# Patient Record
(demographics unavailable — no encounter records)

---

## 2025-02-13 NOTE — PHYSICAL EXAM
[de-identified] : C spine decreased extension and pain on hyperextension The shoulder was prepped with alcohol and ethyl chloride was used to numb the skin. A 3 cc injection with 1 cc xylocaine, 1cc sensoricaine and 1 cc kenalog was given without complication into the AC joint. Patient instructed that there may be an inflammatory flare for 24 hrs , to use ice or advil if needed. HAnd neuro exam intact ( however she says at times it feels weak) Right shoulder AROM  ER 90 IR L3 Positive Neer and Yañez SS 4/5 and subscapularis lift off 4/5 to isometric testing ER (IS) 5/5    [de-identified] : 4 views of right shoulder are WNL no head elevation no GH DJD.

## 2025-02-13 NOTE — HISTORY OF PRESENT ILLNESS
[de-identified] : 48yr old patient presents today with right shoulder pain sustained from an injury 22 years ago when she was playing with her . She had pain on and off but it has worsend over the past 2-3 years.  SHe has arm pain and feeling of weakness plus some hand symptoms.

## 2025-02-13 NOTE — PROCEDURE
[de-identified] : The left shoulder was prepped with alcohol and ethyl chloride was used to numb the skin. A 6 cc injection with 3 cc xylocaine, 2 cc sensoricaine and 1 cc kenalog was given without complication into the SA space. Patient instructed that there may be an inflammatory flare for 24 hrs , to use ice or advil if needed

## 2025-02-13 NOTE — DISCUSSION/SUMMARY
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  On questioning she also has pericervical discomfort.  I believe she likely has a subscapularis and supraspinatus partial or complete tear with secondary impingement however she may also have some mild cervical issues contributing.   Plan:  SA injection today decrease pain some but subscapularis weakness persists. SS improved.  Need MRI to rule out RC tear  Begin PT for shoulder and C spine.  If MRI of shoulder shows no tear will further work-up the C spine.  Need C spine x-rays next visit.  This visit took 45 minutes.

## 2025-03-06 NOTE — PHYSICAL EXAM
[de-identified] : Right shoulder positive Neer and Yañez impingement sign.  SS 4/5 to isometric testing  [de-identified] : MRI shows clear impingement and multi-tendon Tendonitis but no high grade partial or full thickness RC tear.

## 2025-03-06 NOTE — DISCUSSION/SUMMARY
[de-identified] : Clear grade 2 impingement with no high grade partial or full thickness tear.  Plan:  Move forward with 2 months RC and scapular stabilization PRE Meloxicam PO x 6 weeks  F/U 8 weeks

## 2025-03-06 NOTE — HISTORY OF PRESENT ILLNESS
[de-identified] : BERNARD LABOY 48-year female Follow-up for MRI review for right shoulder pain. Her shoulder still bothers her. Injection gave only a week of benefit.

## 2025-05-01 NOTE — PHYSICAL EXAM
[de-identified] : INSPECTION (-) Skin lesion, (-) Scars, (-) Asymmetry, (-) Swelling, (-) Atrophy, (-) Hypertrophy   PALPATION (-) Tenderness to palpation   DEFORMITY (-) Sulcus Sign, (-) Piano Key Sign, (-) Winged Scapula, (-) Scapular Dyskinesis, (-) Axillary Webbing, (-) Jamil Deformity   RANGE OF MOTION Painful Arc: Shoulder range of motion Forward flexion-160  Abduction-160 External rotation-60  Internal rotation-L1 vertebral level      Cervical spine range of motion Flexion-70 Extension-30 lateral flexion R/L-40/40 Lateral rotation R/L-70/70     STRENGTH Rotator cuff strength is 5-/5 with manual muscle testing.     NEUROVASCULAR EXAM Sensation-intact C4-T1 Motor-Grossly Intact   SPECIAL TESTS: (+)Neer Impingement Sign, (+)Yañez Test , (-)Internal Rotation Lag Sign, (-)Lift Off Test, (-)Belly Press, (-)Jobes Test, (-)Drop Sign, (-)[place]'s Test, (-)Crank Test, (-)Speed's Test, (-)Yergason's Sign, (-)Cross-Body Adduction, (-)Chow's Test, (-)Anterior Load and Shift (-)Apprehension, (-)Posterior Load and Shift.

## 2025-05-01 NOTE — DISCUSSION/SUMMARY
[de-identified] : Impression: 47 y/o F with right severe persistent shoulder RC tendonitis and impingement refractory to CSI and PT .for over 8 weeks. Despite no tear on MRI and just signs of severe tendinosis she has increasing night pain. I am coincerned there may be a more extensive partila tear the MRI is missing.   Plan: Pt has failed conservative management to this point. She has 2 mos. of physician directed care with no significant improvement. I recommend she undergo right shoulder arthroscopy, debridement, subacromial decompression. The benefits, risks, and alternative surgery have been discussed and are well understood. She will schedule for surgery in June. She has no medical contraindications for surgery and will get medical H&P and clearance.  Obviously if there is a partial tear of more than 30 percent thickness will move forward with repair. She understands repair would necessitate longer in the sling.

## 2025-05-01 NOTE — HISTORY OF PRESENT ILLNESS
[7] : a current pain level of 7/10 [de-identified] : BERNARD LABOY 48-year female Follow-up for right shoulder pain. pt states she is still experiencing some discomfort in her shoulder especially after doing physical therapy. pt continues physical therapy twice a week.

## 2025-06-10 NOTE — HISTORY OF PRESENT ILLNESS
[Pain Location] : pain [5] : a current pain level of 5/10 [de-identified] : 49 y/o F with right shoulder impingement partial interstitial rotator cuff tear presents for preop teaching session. She is scheduled for right shoulder arthroscopy, debridement, SAD possible RCR.

## 2025-06-10 NOTE — DISCUSSION/SUMMARY
[de-identified] : Impression: 47 y/o RHD F with right shoulder impingement, interstitial RC tear pending right shoulder arthroscopy, debridement, SAD, possible  RCR.  Plan: Pt will proceed as planned. Full post op and pre op teaching program was performed today. Risks and benefits of the surgical procedure were discussed informed consent obtained and post op exercises described. Post op meds and PO rehab were arranged, all questions were answered.

## 2025-06-10 NOTE — PHYSICAL EXAM
[de-identified] : INSPECTION (-) Skin lesion, (-) Scars, (-) Asymmetry, (-) Swelling, (-) Atrophy, (-) Hypertrophy  PALPATION (+) Tenderness to palpation RC insertion  DEFORMITY (-) Sulcus Sign, (-) Piano Key Sign, (-) Winged Scapula, (-) Scapular Dyskinesis, (-) Axillary Webbing, (-) Jamil Deformity  RANGE OF MOTION Painful Arc: present Shoulder range of motion Forward flexion-160 Abduction-160 External rotation-60 Internal rotation-L1 vertebral level    Cervical spine range of motion Flexion-70 Extension-30 lateral flexion R/L-40/40 Lateral rotation R/L-70/70   STRENGTH Rotator cuff strength is 5-/5 with manual muscle testing.   NEUROVASCULAR EXAM Sensation-intact C4-T1 Motor-Grossly Intact  SPECIAL TESTS: (+)Neer Impingement Sign, (+)Yañez Test , (-)Internal Rotation Lag Sign, (-)Lift Off Test, (-)Belly Press, (-)Jobes Test, (-)Drop Sign, (-)Hamilton's Test, (-)Crank Test, (-)Speed's Test, (-)Yergason's Sign, (-)Cross-Body Adduction, (-)Chow's Test, (-)Anterior Load and Shift (-)Apprehension, (-)Posterior Load and Shift.

## 2025-07-03 NOTE — HISTORY OF PRESENT ILLNESS
[___ Days Post Op] : post op day #[unfilled] [Procedure: ___] : status post [unfilled] [3] : the patient reports pain that is 3/10 in severity [Doing Well] : is doing well [Excellent Pain Control] : has excellent pain control [No Sign of Infection] : is showing no signs of infection [de-identified] : 47 y/o F 6 days s/p right shoulder arthroscopy, extensive debridement, SAD. Pain is well controlled with PO analgesics.  [de-identified] : F/U Aug. 7th Pt. advised to start formal PT immediately cont. HEP d/c sling [de-identified] : Wounds are well approximated, no infxn, NVI. ROM: 0-130 degrees FWD            0-125 degrees ABD            60 degrees ext. rot.            internal rotation to L1 vertebral level

## 2025-07-16 NOTE — DISCUSSION/SUMMARY
[de-identified] : Impression: 47 y/o F 2.5 wks s/p Right Shoulder arthroscopy with debridement, SAD  Plan: Pt will continue PT D/C sling Cont. HEP If right upper extremity swelling persists or worsens, we will order RUE US Study to r/o dvt.

## 2025-07-16 NOTE — PHYSICAL EXAM
[de-identified] : Right Upper Extremity Exam: Surgical Wounds are well approximated there is no infxn PROM  degrees              degrees No evidence of DVT

## 2025-07-16 NOTE — HISTORY OF PRESENT ILLNESS
[Pain Location] : pain [] : right shoulder [5] : a current pain level of 5/10 [de-identified] : 47 y/o F who is 2.5 weeks s/p right shoulder arthroscopy presents today complaining increase swelling in right arm. She is currently in PT and does not recall any specific injury. She denies ant paresthesia, loss of motion, and pain is improving.